# Patient Record
Sex: MALE | Race: BLACK OR AFRICAN AMERICAN | NOT HISPANIC OR LATINO | URBAN - METROPOLITAN AREA
[De-identification: names, ages, dates, MRNs, and addresses within clinical notes are randomized per-mention and may not be internally consistent; named-entity substitution may affect disease eponyms.]

---

## 2018-03-26 ENCOUNTER — APPOINTMENT (RX ONLY)
Dept: URBAN - METROPOLITAN AREA OTHER 10 | Facility: OTHER | Age: 33
Setting detail: DERMATOLOGY
End: 2018-03-26

## 2018-03-26 DIAGNOSIS — T78.3XX: ICD-10-CM

## 2018-03-26 DIAGNOSIS — L81.4 OTHER MELANIN HYPERPIGMENTATION: ICD-10-CM

## 2018-03-26 DIAGNOSIS — L50.1 IDIOPATHIC URTICARIA: ICD-10-CM

## 2018-03-26 PROBLEM — T78.3XXA ANGIONEUROTIC EDEMA, INITIAL ENCOUNTER: Status: ACTIVE | Noted: 2018-03-26

## 2018-03-26 PROBLEM — L70.0 ACNE VULGARIS: Status: ACTIVE | Noted: 2018-03-26

## 2018-03-26 PROCEDURE — ? TREATMENT REGIMEN

## 2018-03-26 PROCEDURE — ? COUNSELING

## 2018-03-26 PROCEDURE — 99202 OFFICE O/P NEW SF 15 MIN: CPT

## 2018-03-26 PROCEDURE — ? PRESCRIPTION

## 2018-03-26 RX ORDER — MONTELUKAST SODIUM 10 MG/1
TABLET, FILM COATED ORAL QD
Qty: 30 | Refills: 3 | Status: ERX | COMMUNITY
Start: 2018-03-26

## 2018-03-26 RX ORDER — HYDROXYZINE HYDROCHLORIDE 25 MG/1
TABLET, FILM COATED ORAL QD
Qty: 30 | Refills: 2 | Status: ERX | COMMUNITY
Start: 2018-03-26

## 2018-03-26 RX ORDER — FLURANDRENOLIDE 0.5 MG/ML
LOTION TOPICAL BID
Qty: 1 | Refills: 0 | Status: ERX | COMMUNITY
Start: 2018-03-26

## 2018-03-26 RX ORDER — LORATADINE 10 MG/1
TABLET ORAL
Qty: 30 | Refills: 2 | Status: ERX | COMMUNITY
Start: 2018-03-26

## 2018-03-26 RX ORDER — AZELAIC ACID 0.15 G/G
GEL TOPICAL QD
Qty: 1 | Refills: 3 | Status: ERX | COMMUNITY
Start: 2018-03-26

## 2018-03-26 RX ADMIN — HYDROXYZINE HYDROCHLORIDE: 25 TABLET, FILM COATED ORAL at 19:46

## 2018-03-26 RX ADMIN — FLURANDRENOLIDE: 0.5 LOTION TOPICAL at 19:56

## 2018-03-26 RX ADMIN — AZELAIC ACID: 0.15 GEL TOPICAL at 19:54

## 2018-03-26 RX ADMIN — MONTELUKAST SODIUM: 10 TABLET, FILM COATED ORAL at 19:46

## 2018-03-26 RX ADMIN — LORATADINE: 10 TABLET ORAL at 19:46

## 2018-03-26 ASSESSMENT — LOCATION DETAILED DESCRIPTION DERM
LOCATION DETAILED: RIGHT INFERIOR POSTERIOR NECK
LOCATION DETAILED: RIGHT UPPER CUTANEOUS LIP
LOCATION DETAILED: RIGHT INFERIOR CENTRAL MALAR CHEEK
LOCATION DETAILED: LEFT PROXIMAL PRETIBIAL REGION
LOCATION DETAILED: LEFT DISTAL POSTERIOR THIGH
LOCATION DETAILED: LEFT INFERIOR CENTRAL MALAR CHEEK
LOCATION DETAILED: RIGHT DISTAL CALF
LOCATION DETAILED: LEFT LATERAL ABDOMEN

## 2018-03-26 ASSESSMENT — LOCATION SIMPLE DESCRIPTION DERM
LOCATION SIMPLE: RIGHT CHEEK
LOCATION SIMPLE: LEFT PRETIBIAL REGION
LOCATION SIMPLE: LEFT CHEEK
LOCATION SIMPLE: LEFT POSTERIOR THIGH
LOCATION SIMPLE: ABDOMEN
LOCATION SIMPLE: POSTERIOR NECK
LOCATION SIMPLE: RIGHT LIP
LOCATION SIMPLE: RIGHT CALF

## 2018-03-26 ASSESSMENT — LOCATION ZONE DERM
LOCATION ZONE: LEG
LOCATION ZONE: FACE
LOCATION ZONE: NECK
LOCATION ZONE: LIP
LOCATION ZONE: TRUNK

## 2018-03-26 ASSESSMENT — SEVERITY ASSESSMENT: SEVERITY: MODERATE

## 2018-03-26 NOTE — PROCEDURE: TREATMENT REGIMEN
Samples Given: Cordran lotion
Detail Level: Zone
Plan: Start Claritin twice daily and hydroxizine once daily at night and singular once daily
Samples Given: Finacea foam twice daily

## 2018-03-27 RX ORDER — FLURANDRENOLIDE 0.5 MG/ML
LOTION TOPICAL BID
Qty: 120 | Refills: 0 | Status: ERX

## 2018-03-29 ENCOUNTER — RX ONLY (OUTPATIENT)
Age: 33
Setting detail: RX ONLY
End: 2018-03-29

## 2018-03-29 RX ORDER — TRETINOIN 0.25 MG/G
CREAM TOPICAL
Qty: 45 | Refills: 2 | Status: ERX | COMMUNITY
Start: 2018-03-29

## 2018-03-29 RX ORDER — HYDROQUINONE 4 %
CREAM (GRAM) TOPICAL
Qty: 30 | Refills: 2 | Status: ERX | COMMUNITY
Start: 2018-03-29

## 2020-07-15 ENCOUNTER — WEB ENCOUNTER (OUTPATIENT)
Dept: URBAN - METROPOLITAN AREA CLINIC 40 | Facility: CLINIC | Age: 35
End: 2020-07-15

## 2020-07-29 ENCOUNTER — OFFICE VISIT (OUTPATIENT)
Dept: URBAN - METROPOLITAN AREA CLINIC 74 | Facility: CLINIC | Age: 35
End: 2020-07-29
Payer: MEDICAID

## 2020-07-29 ENCOUNTER — WEB ENCOUNTER (OUTPATIENT)
Dept: URBAN - METROPOLITAN AREA CLINIC 74 | Facility: CLINIC | Age: 35
End: 2020-07-29

## 2020-07-29 DIAGNOSIS — R14.0 BLOATING/GAS PAIN: ICD-10-CM

## 2020-07-29 DIAGNOSIS — K59.01 CONSTIPATION, SLOW TRANSIT: ICD-10-CM

## 2020-07-29 DIAGNOSIS — K64.8 INTERNAL HEMORRHOIDS: ICD-10-CM

## 2020-07-29 DIAGNOSIS — R15.1 FECAL SMEARING: ICD-10-CM

## 2020-07-29 PROCEDURE — 1036F TOBACCO NON-USER: CPT | Performed by: INTERNAL MEDICINE

## 2020-07-29 PROCEDURE — G8420 CALC BMI NORM PARAMETERS: HCPCS | Performed by: INTERNAL MEDICINE

## 2020-07-29 PROCEDURE — 99213 OFFICE O/P EST LOW 20 MIN: CPT | Performed by: INTERNAL MEDICINE

## 2020-07-29 PROCEDURE — G8427 DOCREV CUR MEDS BY ELIG CLIN: HCPCS | Performed by: INTERNAL MEDICINE

## 2020-07-29 RX ORDER — LUBIPROSTONE 24 UG/1
1 CAPSULE WITH FOOD AND WATER CAPSULE, GELATIN COATED ORAL TWICE A DAY
Qty: 28 CAPSULE | Refills: 0 | OUTPATIENT
Start: 2020-07-29 | End: 2020-08-12

## 2020-07-29 RX ORDER — PLECANATIDE 3 MG/1
TAKE 1 TABLET (3 MG) BY ORAL ROUTE ONCE DAILY FOR 90 DAYS TABLET ORAL 1
OUTPATIENT
Start: 2020-05-01 | End: 2020-10-28

## 2020-07-29 RX ORDER — PLECANATIDE 3 MG/1
TAKE 1 TABLET (3 MG) BY ORAL ROUTE ONCE DAILY FOR 90 DAYS TABLET ORAL 1
Qty: 90 | Refills: 1 | Status: DISCONTINUED | COMMUNITY
Start: 2020-05-01 | End: 2020-10-28

## 2020-07-29 NOTE — HPI-TODAY'S VISIT:
Today July 29, 2020 the patient returns for a follow-up visit, the patient was last seen April 14, 2020 during a telehealth visit, the patient stated that Linzess 290 mcg did not work, patient was taking bisacodyl 3 tablets daily so the patient could have a bowel movement, unfortunately the medication gave the patient severe cramps.  The Linzess and bisacodyl were substituted with Trulance 3 mg daily. A colonoscopy performed in September 2019 only showed internal hemorrhoids.  Sits marker study showed excessive colonic fecal matter and 3 markers remained in the left colon. The patient tried to take the Trulance 3 mg daily, he defecated initially after taking the Trulance, the effect was positive for maybe 2 to 3 days and then he became constipated again, the patient does respond to bisacodyl, he does not respond to other medications, the patient goes as long as 2 to 3 weeks without defecating until he takes the bisacodyl. The patient will try Amitiza 24 mcg twice daily, samples were provided, he will report progress in 2 weeks.  The patient was instructed to increase his p.o. intake of fiber and to drink at least 64 ounces of water a day.  The patient denied using any pain medications

## 2020-08-14 ENCOUNTER — WEB ENCOUNTER (OUTPATIENT)
Dept: URBAN - METROPOLITAN AREA CLINIC 74 | Facility: CLINIC | Age: 35
End: 2020-08-14

## 2020-10-16 ENCOUNTER — WEB ENCOUNTER (OUTPATIENT)
Dept: URBAN - METROPOLITAN AREA CLINIC 74 | Facility: CLINIC | Age: 35
End: 2020-10-16

## 2020-10-22 ENCOUNTER — OFFICE VISIT (OUTPATIENT)
Dept: URBAN - METROPOLITAN AREA CLINIC 74 | Facility: CLINIC | Age: 35
End: 2020-10-22

## 2020-10-22 RX ORDER — PLECANATIDE 3 MG/1
TAKE 1 TABLET (3 MG) BY ORAL ROUTE ONCE DAILY FOR 90 DAYS TABLET ORAL 1
OUTPATIENT

## 2020-12-07 ENCOUNTER — WEB ENCOUNTER (OUTPATIENT)
Dept: URBAN - METROPOLITAN AREA CLINIC 74 | Facility: CLINIC | Age: 35
End: 2020-12-07

## 2020-12-09 ENCOUNTER — WEB ENCOUNTER (OUTPATIENT)
Dept: URBAN - METROPOLITAN AREA CLINIC 74 | Facility: CLINIC | Age: 35
End: 2020-12-09

## 2020-12-14 ENCOUNTER — WEB ENCOUNTER (OUTPATIENT)
Dept: URBAN - METROPOLITAN AREA CLINIC 74 | Facility: CLINIC | Age: 35
End: 2020-12-14

## 2020-12-14 ENCOUNTER — OFFICE VISIT (OUTPATIENT)
Dept: URBAN - METROPOLITAN AREA CLINIC 74 | Facility: CLINIC | Age: 35
End: 2020-12-14
Payer: MEDICAID

## 2020-12-14 VITALS
TEMPERATURE: 97.2 F | HEIGHT: 72 IN | WEIGHT: 186.2 LBS | SYSTOLIC BLOOD PRESSURE: 118 MMHG | HEART RATE: 81 BPM | DIASTOLIC BLOOD PRESSURE: 70 MMHG | BODY MASS INDEX: 25.22 KG/M2 | OXYGEN SATURATION: 97 %

## 2020-12-14 DIAGNOSIS — R14.0 BLOATING/GAS PAIN: ICD-10-CM

## 2020-12-14 DIAGNOSIS — K59.01 CONSTIPATION, SLOW TRANSIT: ICD-10-CM

## 2020-12-14 DIAGNOSIS — R15.1 FECAL SMEARING: ICD-10-CM

## 2020-12-14 DIAGNOSIS — K64.8 INTERNAL HEMORRHOIDS: ICD-10-CM

## 2020-12-14 PROCEDURE — 99213 OFFICE O/P EST LOW 20 MIN: CPT | Performed by: INTERNAL MEDICINE

## 2020-12-14 PROCEDURE — G8419 CALC BMI OUT NRM PARAM NOF/U: HCPCS | Performed by: INTERNAL MEDICINE

## 2020-12-14 PROCEDURE — 1036F TOBACCO NON-USER: CPT | Performed by: INTERNAL MEDICINE

## 2020-12-14 PROCEDURE — G8427 DOCREV CUR MEDS BY ELIG CLIN: HCPCS | Performed by: INTERNAL MEDICINE

## 2020-12-14 PROCEDURE — G8484 FLU IMMUNIZE NO ADMIN: HCPCS | Performed by: INTERNAL MEDICINE

## 2020-12-14 RX ORDER — LINACLOTIDE 290 UG/1
1 CAPSULE AT LEAST 30 MINUTES BEFORE THE FIRST MEAL OF THE DAY ON AN EMPTY STOMACH CAPSULE, GELATIN COATED ORAL ONCE A DAY
Qty: 30 | Refills: 0 | OUTPATIENT
Start: 2020-12-14 | End: 2021-01-13

## 2020-12-14 RX ORDER — POLYETHYLENE GLYCOL 3350, SODIUM SULFATE ANHYDROUS, SODIUM BICARBONATE, SODIUM CHLORIDE, POTASSIUM CHLORIDE 236; 22.74; 6.74; 5.86; 2.97 G/4L; G/4L; G/4L; G/4L; G/4L
AS DIRECTED POWDER, FOR SOLUTION ORAL ONCE
Qty: 1 | Refills: 0 | OUTPATIENT
Start: 2020-12-14 | End: 2020-12-15

## 2020-12-14 RX ORDER — PLECANATIDE 3 MG/1
TAKE 1 TABLET (3 MG) BY ORAL ROUTE ONCE DAILY FOR 90 DAYS TABLET ORAL 1
OUTPATIENT

## 2020-12-14 RX ORDER — LUBIPROSTONE 24 UG/1
1 CAPSULE WITH FOOD AND WATER CAPSULE, GELATIN COATED ORAL TWICE A DAY
OUTPATIENT
Start: 2020-07-29 | End: 2020-08-12

## 2020-12-14 NOTE — HPI-TODAY'S VISIT:
Today July 29, 2020 the patient returns for a follow-up visit, the patient was last seen April 14, 2020 during a telehealth visit, the patient stated that Linzess 290 mcg did not work, patient was taking bisacodyl 3 tablets daily so the patient could have a bowel movement, unfortunately the medication gave the patient severe cramps.  The Linzess and bisacodyl were substituted with Trulance 3 mg daily. A colonoscopy performed in September 2019 only showed internal hemorrhoids.  Sits marker study showed excessive colonic fecal matter and 3 markers remained in the left colon. The patient tried to take the Trulance 3 mg daily, he defecated initially after taking the Trulance, the effect was positive for maybe 2 to 3 days and then he became constipated again, the patient does respond to bisacodyl, he does not respond to other medications, the patient goes as long as 2 to 3 weeks without defecating until he takes the bisacodyl. The patient will try Amitiza 24 mcg twice daily, samples were provided, he will report progress in 2 weeks.  The patient was instructed to increase his p.o. intake of fiber and to drink at least 64 ounces of water a day.  The patient denied using any pain medications Today December 14, 2020 the patient returns for a follow-up visit, the patient was last seen in the office on October 22, 2020 with constipation due to slow transit, bloating and gas, fecal smearing and internal hemorrhoids. At the time of the last visit we discussed his September 2019 colonoscopy that showed internal hemorrhoids, a sitz marker study showed excessive fecal matter and 3 markers in the left colon, the patient tried Trulance but did not respond well, he does respond to bisacodyl.  The patient agreed to try Amitiza 24 mcg twice daily, samples were provided. Today the patient returns to the office stating that he continues to have severe constipation, the Amitiza did not work at all and he continues to take over-the-counter medications with poor results. The patient has not defecated in several days, the patient will follow a clear liquid diet for 24 hours and take GoLYTELY, he will then follow with Linzess 290 mcg in the a.m.  Pending results we may add milk of magnesia 30 cc at bedtime followed by 2 glasses of water.  The patient will return for a follow-up visit in 6 weeks.

## 2020-12-16 ENCOUNTER — WEB ENCOUNTER (OUTPATIENT)
Dept: URBAN - METROPOLITAN AREA CLINIC 74 | Facility: CLINIC | Age: 35
End: 2020-12-16

## 2021-01-11 ENCOUNTER — OFFICE VISIT (OUTPATIENT)
Dept: URBAN - METROPOLITAN AREA CLINIC 74 | Facility: CLINIC | Age: 36
End: 2021-01-11
Payer: MEDICAID

## 2021-01-11 VITALS
BODY MASS INDEX: 24.71 KG/M2 | HEART RATE: 73 BPM | HEIGHT: 72 IN | SYSTOLIC BLOOD PRESSURE: 104 MMHG | OXYGEN SATURATION: 98 % | WEIGHT: 182.4 LBS | DIASTOLIC BLOOD PRESSURE: 70 MMHG | TEMPERATURE: 97.3 F

## 2021-01-11 DIAGNOSIS — R14.0 BLOATING/GAS PAIN: ICD-10-CM

## 2021-01-11 DIAGNOSIS — K59.01 CONSTIPATION, SLOW TRANSIT: ICD-10-CM

## 2021-01-11 DIAGNOSIS — K64.8 INTERNAL HEMORRHOIDS: ICD-10-CM

## 2021-01-11 DIAGNOSIS — R15.1 FECAL SMEARING: ICD-10-CM

## 2021-01-11 PROCEDURE — G8484 FLU IMMUNIZE NO ADMIN: HCPCS | Performed by: INTERNAL MEDICINE

## 2021-01-11 PROCEDURE — G8420 CALC BMI NORM PARAMETERS: HCPCS | Performed by: INTERNAL MEDICINE

## 2021-01-11 PROCEDURE — G8427 DOCREV CUR MEDS BY ELIG CLIN: HCPCS | Performed by: INTERNAL MEDICINE

## 2021-01-11 PROCEDURE — 1036F TOBACCO NON-USER: CPT | Performed by: INTERNAL MEDICINE

## 2021-01-11 PROCEDURE — 99213 OFFICE O/P EST LOW 20 MIN: CPT | Performed by: INTERNAL MEDICINE

## 2021-01-11 RX ORDER — LINACLOTIDE 290 UG/1
1 CAPSULE AT LEAST 30 MINUTES BEFORE THE FIRST MEAL OF THE DAY ON AN EMPTY STOMACH CAPSULE, GELATIN COATED ORAL ONCE A DAY
OUTPATIENT
Start: 2020-12-14 | End: 2021-01-13

## 2021-01-11 RX ORDER — LINACLOTIDE 290 UG/1
1 CAPSULE AT LEAST 30 MINUTES BEFORE THE FIRST MEAL OF THE DAY ON AN EMPTY STOMACH CAPSULE, GELATIN COATED ORAL ONCE A DAY
Qty: 30 | Refills: 0 | Status: ACTIVE | COMMUNITY
Start: 2020-12-14 | End: 2021-01-13

## 2021-01-11 NOTE — HPI-TODAY'S VISIT:
Today July 29, 2020 the patient returns for a follow-up visit, the patient was last seen April 14, 2020 during a telehealth visit, the patient stated that Linzess 290 mcg did not work, patient was taking bisacodyl 3 tablets daily so the patient could have a bowel movement, unfortunately the medication gave the patient severe cramps.  The Linzess and bisacodyl were substituted with Trulance 3 mg daily. A colonoscopy performed in September 2019 only showed internal hemorrhoids.  Sits marker study showed excessive colonic fecal matter and 3 markers remained in the left colon. The patient tried to take the Trulance 3 mg daily, he defecated initially after taking the Trulance, the effect was positive for maybe 2 to 3 days and then he became constipated again, the patient does respond to bisacodyl, he does not respond to other medications, the patient goes as long as 2 to 3 weeks without defecating until he takes the bisacodyl. The patient will try Amitiza 24 mcg twice daily, samples were provided, he will report progress in 2 weeks.  The patient was instructed to increase his p.o. intake of fiber and to drink at least 64 ounces of water a day.  The patient denied using any pain medications Today December 14, 2020 the patient returns for a follow-up visit, the patient was last seen in the office on October 22, 2020 with constipation due to slow transit, bloating and gas, fecal smearing and internal hemorrhoids. At the time of the last visit we discussed his September 2019 colonoscopy that showed internal hemorrhoids, a sitz marker study showed excessive fecal matter and 3 markers in the left colon, the patient tried Trulance but did not respond well, he does respond to bisacodyl.  The patient agreed to try Amitiza 24 mcg twice daily, samples were provided. Today the patient returns to the office stating that he continues to have severe constipation, the Amitiza did not work at all and he continues to take over-the-counter medications with poor results. The patient has not defecated in several days, the patient will follow a clear liquid diet for 24 hours and take GoLYTELY, he will then follow with Linzess 290 mcg in the a.m.  Pending results we may add milk of magnesia 30 cc at bedtime followed by 2 glasses of water.  The patient will return for a follow-up visit in 6 weeks. Today January 11, 2021 the patient returns for a follow-up visit, the patient was last seen in the office on December 14, 2020 with constipation due to slow transit, bloating and gas pain, fecal smearing and internal hemorrhoids. At the time of the last visit the patient remained severely constipated, the patient stated that the Amitiza did not work at all and he continued to take over-the-counter medications with poor results.  The patient has not defecated in several days, the patient was advised to remain on a clear liquid diet for 24 hours and take a GoLYTELY prep.  The patient would then follow with Linzess 290 mcg daily.  Pending results he was advised to add milk of magnesia 30 cc at bedtime, the patient was advised to return for a follow-up visit in 6 weeks.  The patient had failed to treatment with Trulance.  The patient's last colonoscopy was performed on September 2019 and it showed internal hemorrhoids.  A sitz marker study revealed excessive stool throughout the colon, there were 3 markers in the left colon. Today the patient returns stating that he has significantly improved, he did follow the previously mentioned instructions and took the GoLYTELY prep followed with milk of magnesia, subsequently the patient found an over-the-counter medication calledZupoo which contains cascara, the patient has been combining the Linzess 290 mcg with theZupoo and is currently having daily bowel movements, he no longer has any abdominal pain.  The patient has received samples from us, [employer] does not cover the medication and he cannot afford it. The patient received samples today and will return for a follow-up visit in 6 weeks.

## 2021-02-22 ENCOUNTER — OFFICE VISIT (OUTPATIENT)
Dept: URBAN - METROPOLITAN AREA CLINIC 74 | Facility: CLINIC | Age: 36
End: 2021-02-22
Payer: MEDICAID

## 2021-02-22 ENCOUNTER — DASHBOARD ENCOUNTERS (OUTPATIENT)
Age: 36
End: 2021-02-22

## 2021-02-22 VITALS
BODY MASS INDEX: 24.52 KG/M2 | TEMPERATURE: 97.9 F | HEART RATE: 64 BPM | OXYGEN SATURATION: 97 % | DIASTOLIC BLOOD PRESSURE: 64 MMHG | WEIGHT: 181 LBS | HEIGHT: 72 IN | SYSTOLIC BLOOD PRESSURE: 120 MMHG

## 2021-02-22 DIAGNOSIS — K64.8 INTERNAL HEMORRHOIDS: ICD-10-CM

## 2021-02-22 DIAGNOSIS — R14.0 BLOATING/GAS PAIN: ICD-10-CM

## 2021-02-22 DIAGNOSIS — R15.1 FECAL SMEARING: ICD-10-CM

## 2021-02-22 DIAGNOSIS — K59.01 CONSTIPATION, SLOW TRANSIT: ICD-10-CM

## 2021-02-22 PROCEDURE — 99213 OFFICE O/P EST LOW 20 MIN: CPT | Performed by: INTERNAL MEDICINE

## 2021-02-22 RX ORDER — LINACLOTIDE 290 UG/1
1 CAPSULE AT LEAST 30 MINUTES BEFORE THE FIRST MEAL OF THE DAY ON AN EMPTY STOMACH CAPSULE, GELATIN COATED ORAL ONCE A DAY
Status: ACTIVE | COMMUNITY

## 2021-02-22 RX ORDER — LINACLOTIDE 290 UG/1
1 CAPSULE AT LEAST 30 MINUTES BEFORE THE FIRST MEAL OF THE DAY ON AN EMPTY STOMACH CAPSULE, GELATIN COATED ORAL ONCE A DAY
OUTPATIENT

## 2021-02-22 NOTE — HPI-TODAY'S VISIT:
Today February 22, 2021 the patient returns for a follow-up visit, the patient was last seen on January 11, 2021 with constipation due to slow transit, bloating and gas pain, fecal smearing and internal hemorrhoids at the time of the last visit the patient stated that he had significantly improved after taking a GoLYTELY prep followed by milk of magnesia, subsequently he found an over-the-counter medication called Zupoo which contains cascara which she combined with Linzess 290 mcg and was having daily bowel movements.  The patient no longer had any abdominal pain.  The patient received Linzess samples in view of the poor insurance coverage from his medications.  The patient was advised to return for a follow-up visit in 6 weeks. Today the patient returns to the office stating that although as long as he takes the Linzess 290 mcg daily along with the cascara preparation he has daily bowel movements, no longer had post he have abdominal pain, bloating. The patient will remain on current medications and will return for a follow-up visit in 6 months.  The patient has no access to the Linzess so samples are being provided.

## 2021-04-01 ENCOUNTER — WEB ENCOUNTER (OUTPATIENT)
Dept: URBAN - METROPOLITAN AREA CLINIC 74 | Facility: CLINIC | Age: 36
End: 2021-04-01

## 2021-04-03 ENCOUNTER — WEB ENCOUNTER (OUTPATIENT)
Dept: URBAN - METROPOLITAN AREA CLINIC 74 | Facility: CLINIC | Age: 36
End: 2021-04-03